# Patient Record
Sex: FEMALE | Race: WHITE
[De-identification: names, ages, dates, MRNs, and addresses within clinical notes are randomized per-mention and may not be internally consistent; named-entity substitution may affect disease eponyms.]

---

## 2019-12-18 ENCOUNTER — HOSPITAL ENCOUNTER (OUTPATIENT)
Dept: HOSPITAL 61 - PCVCIMAG | Age: 79
Discharge: HOME | End: 2019-12-18
Attending: INTERNAL MEDICINE
Payer: MEDICARE

## 2019-12-18 DIAGNOSIS — Z82.49: ICD-10-CM

## 2019-12-18 DIAGNOSIS — I10: ICD-10-CM

## 2019-12-18 DIAGNOSIS — E78.00: ICD-10-CM

## 2019-12-18 DIAGNOSIS — Z79.899: ICD-10-CM

## 2019-12-18 DIAGNOSIS — E78.5: ICD-10-CM

## 2019-12-18 DIAGNOSIS — I08.3: Primary | ICD-10-CM

## 2019-12-18 PROCEDURE — 93306 TTE W/DOPPLER COMPLETE: CPT

## 2019-12-18 PROCEDURE — 80061 LIPID PANEL: CPT

## 2019-12-18 PROCEDURE — 36415 COLL VENOUS BLD VENIPUNCTURE: CPT

## 2019-12-18 PROCEDURE — 93005 ELECTROCARDIOGRAM TRACING: CPT

## 2019-12-18 PROCEDURE — G0463 HOSPITAL OUTPT CLINIC VISIT: HCPCS

## 2019-12-18 NOTE — PCVCIMAG
--------------- APPROVED REPORT --------------





Study performed:  2019 08:33:55



EXAM: Comprehensive 2D, Doppler, and color-flow 

Echocardiogram

Patient Location: Echo lab

Status:  routine



BSA:         1.68

HR: 70 bpmBP:          142/82 mmHg

Rhythm: NSR



Other Information 

Study Quality: Adequate



Indications

Murmur

Dyspnea 

fam hx cad



2D Dimensions

IVSd:  9.91 (7-11mm)

LVDd:  30.86 mm

PWd:  9.84 (7-11mm)Ascending Ao:  36.33 (22-36mm)

LVDs:  22.72 (25-40mm)

Left Atrium:  36.35 (27-40mm)

Aortic Root:  34.01 mm

LV Single Plane 4CH:  59.39 %

LV Single Plane 2CH:  54.64 %

Biplane EF:  56.8 %



Volumes

Left Atrial Volume (Systole)

Single Plane 4CH:  33.02 mLSingle Plane 2CH:  49.53 mL

LA ESV Index:  24.00 mL/m2



Aortic Valve

AoV Peak Yomi.:  2.07 m/s

AO Peak Gr.:  17.13 mmHgLVOT Max P.95 mmHg

LVOT Max V:  1.22 m/s

AI Vmax:  4.50 m/s

AI Putnam:  3.20 m/s2

AI PHT:  408.49 ms



Mitral Valve

E/A Ratio:  0.9

MV Decel. Time:  245.95 ms

MV E Max Yomi.:  0.64 m/s

MV A Yomi.:  0.74 m/s

IVRT:  121.11 ms



Pulmonary Valve

PV Peak Yomi.:  0.71 m/sPV Peak Gr.:  2.04 mmHg



Pulmonary Vein

P Vein S:    0.33 m/sP Vein A:  0.35 m/s

P Vein D:   0.41 m/sP Vein A Dur.:  166.1 msec

P Vein S/D Ratio:  0.80



Tricuspid Valve

TR Peak Yomi.:  2.50 m/s

TR Peak Gr.:  25.10 mmHg



Left Ventricle

The left ventricle is normal size. There is normal LV segmental wall 

motion. There is normal left ventricular wall thickness. Left 

ventricular systolic function is normal. The left ventricular 

ejection fraction is within the normal range. LVEF is 55-60%. Grade I 

- abnormal relaxation pattern.



Right Ventricle

The right ventricle is normal size. The right ventricular systolic 

function is normal.



Atria

The left atrium size is normal. The right atrium size is 

normal.



Aortic Valve

Mild aortic valve sclerosis. Mild to moderate aortic regurgitation. 

There is no aortic valvular stenosis.



Mitral Valve

The mitral valve is normal in structure. Mild mitral regurgitation. 

No evidence of mitral valve stenosis.



Tricuspid Valve

The tricuspid valve is normal in structure. Mild tricuspid 

regurgitation with PAP of 32 mmHg.



Pulmonic Valve

The pulmonary valve is normal in structure. There is no pulmonic 

valvular regurgitation.



Great Vessels

The aortic root is normal in size. IVC is normal in size and 

collapses >50% with inspiration.



Pericardium

There is no pericardial effusion. There is no pleural 

effusion.



<Conclusion>

The left ventricle is normal size.

LVEF is 55-60%.

Grade I - abnormal relaxation pattern.

The right ventricle is normal size.

The left atrium size is normal.

Mild aortic valve sclerosis.

Mild to moderate aortic regurgitation.

There is no aortic valvular stenosis.

Mild mitral regurgitation.

Mild tricuspid regurgitation with PAP of 32 mmHg.

The aortic root is normal in size.

There is no pericardial effusion.

## 2020-10-30 ENCOUNTER — HOSPITAL ENCOUNTER (OUTPATIENT)
Dept: HOSPITAL 35 - SJCVCIMAG | Age: 80
End: 2020-10-30
Attending: INTERNAL MEDICINE
Payer: COMMERCIAL

## 2020-10-30 DIAGNOSIS — Z79.899: ICD-10-CM

## 2020-10-30 DIAGNOSIS — R01.1: ICD-10-CM

## 2020-10-30 DIAGNOSIS — E78.00: ICD-10-CM

## 2020-10-30 DIAGNOSIS — I08.2: Primary | ICD-10-CM

## 2020-10-30 DIAGNOSIS — I10: ICD-10-CM

## 2021-08-18 ENCOUNTER — HOSPITAL ENCOUNTER (OUTPATIENT)
Dept: HOSPITAL 35 - SJCVCIMAG | Age: 81
End: 2021-08-18
Attending: INTERNAL MEDICINE
Payer: COMMERCIAL

## 2021-08-18 DIAGNOSIS — R55: ICD-10-CM

## 2021-08-18 DIAGNOSIS — E78.5: ICD-10-CM

## 2021-08-18 DIAGNOSIS — I65.23: Primary | ICD-10-CM

## 2021-08-18 DIAGNOSIS — I10: ICD-10-CM

## 2021-08-18 DIAGNOSIS — Z82.49: ICD-10-CM

## 2021-08-18 DIAGNOSIS — Z90.710: ICD-10-CM

## 2021-08-18 DIAGNOSIS — I38: ICD-10-CM

## 2021-08-18 DIAGNOSIS — I35.0: ICD-10-CM

## 2021-08-18 DIAGNOSIS — E78.00: ICD-10-CM

## 2021-08-18 DIAGNOSIS — Z79.899: ICD-10-CM
